# Patient Record
Sex: MALE | Race: WHITE | URBAN - METROPOLITAN AREA
[De-identification: names, ages, dates, MRNs, and addresses within clinical notes are randomized per-mention and may not be internally consistent; named-entity substitution may affect disease eponyms.]

---

## 2019-05-21 ENCOUNTER — HOSPITAL ENCOUNTER (INPATIENT)
Age: 39
LOS: 5 days | Discharge: HOME | DRG: 885 | End: 2019-05-26
Attending: EMERGENCY MEDICINE | Admitting: PSYCHIATRY & NEUROLOGY

## 2019-05-21 DIAGNOSIS — R44.3 HALLUCINATIONS: Primary | ICD-10-CM

## 2019-05-21 PROBLEM — F20.9 SCHIZOPHRENIA (HCC): Status: ACTIVE | Noted: 2019-05-21

## 2019-05-21 LAB
A/G RATIO: 1.6 (ref 1.1–2.2)
ACETAMINOPHEN LEVEL: <5 UG/ML (ref 10–30)
ALBUMIN SERPL-MCNC: 4.4 G/DL (ref 3.4–5)
ALP BLD-CCNC: 61 U/L (ref 40–129)
ALT SERPL-CCNC: 10 U/L (ref 10–40)
AMPHETAMINE SCREEN, URINE: ABNORMAL
ANION GAP SERPL CALCULATED.3IONS-SCNC: 10 MMOL/L (ref 3–16)
AST SERPL-CCNC: 18 U/L (ref 15–37)
BARBITURATE SCREEN URINE: ABNORMAL
BASOPHILS ABSOLUTE: 0.1 K/UL (ref 0–0.2)
BASOPHILS RELATIVE PERCENT: 0.5 %
BENZODIAZEPINE SCREEN, URINE: ABNORMAL
BILIRUB SERPL-MCNC: 0.6 MG/DL (ref 0–1)
BUN BLDV-MCNC: 6 MG/DL (ref 7–20)
CALCIUM SERPL-MCNC: 9 MG/DL (ref 8.3–10.6)
CANNABINOID SCREEN URINE: POSITIVE
CHLORIDE BLD-SCNC: 105 MMOL/L (ref 99–110)
CO2: 24 MMOL/L (ref 21–32)
COCAINE METABOLITE SCREEN URINE: ABNORMAL
CREAT SERPL-MCNC: 0.6 MG/DL (ref 0.9–1.3)
EKG ATRIAL RATE: 50 BPM
EKG DIAGNOSIS: NORMAL
EKG P AXIS: 54 DEGREES
EKG P-R INTERVAL: 120 MS
EKG Q-T INTERVAL: 404 MS
EKG QRS DURATION: 102 MS
EKG QTC CALCULATION (BAZETT): 368 MS
EKG R AXIS: 87 DEGREES
EKG T AXIS: 78 DEGREES
EKG VENTRICULAR RATE: 50 BPM
EOSINOPHILS ABSOLUTE: 0.2 K/UL (ref 0–0.6)
EOSINOPHILS RELATIVE PERCENT: 1.6 %
ETHANOL: NORMAL MG/DL (ref 0–0.08)
GFR AFRICAN AMERICAN: >60
GFR NON-AFRICAN AMERICAN: >60
GLOBULIN: 2.8 G/DL
GLUCOSE BLD-MCNC: 116 MG/DL (ref 70–99)
HCT VFR BLD CALC: 40.2 % (ref 40.5–52.5)
HEMOGLOBIN: 14.2 G/DL (ref 13.5–17.5)
LYMPHOCYTES ABSOLUTE: 2.1 K/UL (ref 1–5.1)
LYMPHOCYTES RELATIVE PERCENT: 19.7 %
Lab: ABNORMAL
MCH RBC QN AUTO: 33.3 PG (ref 26–34)
MCHC RBC AUTO-ENTMCNC: 35.4 G/DL (ref 31–36)
MCV RBC AUTO: 94 FL (ref 80–100)
METHADONE SCREEN, URINE: ABNORMAL
MONOCYTES ABSOLUTE: 1 K/UL (ref 0–1.3)
MONOCYTES RELATIVE PERCENT: 9.2 %
NEUTROPHILS ABSOLUTE: 7.2 K/UL (ref 1.7–7.7)
NEUTROPHILS RELATIVE PERCENT: 69 %
OPIATE SCREEN URINE: ABNORMAL
OXYCODONE URINE: ABNORMAL
PDW BLD-RTO: 13.7 % (ref 12.4–15.4)
PH UA: 6
PHENCYCLIDINE SCREEN URINE: ABNORMAL
PLATELET # BLD: 320 K/UL (ref 135–450)
PMV BLD AUTO: 7.8 FL (ref 5–10.5)
POTASSIUM SERPL-SCNC: 3.7 MMOL/L (ref 3.5–5.1)
PROPOXYPHENE SCREEN: ABNORMAL
RBC # BLD: 4.27 M/UL (ref 4.2–5.9)
SALICYLATE, SERUM: 0.5 MG/DL (ref 15–30)
SODIUM BLD-SCNC: 139 MMOL/L (ref 136–145)
TOTAL PROTEIN: 7.2 G/DL (ref 6.4–8.2)
WBC # BLD: 10.4 K/UL (ref 4–11)

## 2019-05-21 PROCEDURE — 80307 DRUG TEST PRSMV CHEM ANLYZR: CPT

## 2019-05-21 PROCEDURE — G0480 DRUG TEST DEF 1-7 CLASSES: HCPCS

## 2019-05-21 PROCEDURE — 85025 COMPLETE CBC W/AUTO DIFF WBC: CPT

## 2019-05-21 PROCEDURE — 99223 1ST HOSP IP/OBS HIGH 75: CPT | Performed by: PSYCHIATRY & NEUROLOGY

## 2019-05-21 PROCEDURE — 6370000000 HC RX 637 (ALT 250 FOR IP): Performed by: PSYCHIATRY & NEUROLOGY

## 2019-05-21 PROCEDURE — 93010 ELECTROCARDIOGRAM REPORT: CPT | Performed by: INTERNAL MEDICINE

## 2019-05-21 PROCEDURE — 1240000000 HC EMOTIONAL WELLNESS R&B

## 2019-05-21 PROCEDURE — 97535 SELF CARE MNGMENT TRAINING: CPT

## 2019-05-21 PROCEDURE — 93005 ELECTROCARDIOGRAM TRACING: CPT | Performed by: PSYCHIATRY & NEUROLOGY

## 2019-05-21 PROCEDURE — 80053 COMPREHEN METABOLIC PANEL: CPT

## 2019-05-21 PROCEDURE — 99285 EMERGENCY DEPT VISIT HI MDM: CPT

## 2019-05-21 PROCEDURE — 99222 1ST HOSP IP/OBS MODERATE 55: CPT | Performed by: PHYSICIAN ASSISTANT

## 2019-05-21 PROCEDURE — 97165 OT EVAL LOW COMPLEX 30 MIN: CPT

## 2019-05-21 RX ORDER — TRAZODONE HYDROCHLORIDE 50 MG/1
50 TABLET ORAL NIGHTLY PRN
Status: DISCONTINUED | OUTPATIENT
Start: 2019-05-21 | End: 2019-05-26 | Stop reason: HOSPADM

## 2019-05-21 RX ORDER — ZIPRASIDONE MESYLATE 20 MG/ML
20 INJECTION, POWDER, LYOPHILIZED, FOR SOLUTION INTRAMUSCULAR
Status: ACTIVE | OUTPATIENT
Start: 2019-05-21 | End: 2019-05-21

## 2019-05-21 RX ORDER — MAGNESIUM HYDROXIDE/ALUMINUM HYDROXICE/SIMETHICONE 120; 1200; 1200 MG/30ML; MG/30ML; MG/30ML
30 SUSPENSION ORAL EVERY 6 HOURS PRN
Status: DISCONTINUED | OUTPATIENT
Start: 2019-05-21 | End: 2019-05-26 | Stop reason: HOSPADM

## 2019-05-21 RX ORDER — NICOTINE 21 MG/24HR
1 PATCH, TRANSDERMAL 24 HOURS TRANSDERMAL DAILY
Status: DISCONTINUED | OUTPATIENT
Start: 2019-05-21 | End: 2019-05-26 | Stop reason: HOSPADM

## 2019-05-21 RX ORDER — HYDROXYZINE PAMOATE 50 MG/1
50 CAPSULE ORAL 3 TIMES DAILY PRN
Status: DISCONTINUED | OUTPATIENT
Start: 2019-05-21 | End: 2019-05-26 | Stop reason: HOSPADM

## 2019-05-21 RX ORDER — BENZTROPINE MESYLATE 1 MG/ML
2 INJECTION INTRAMUSCULAR; INTRAVENOUS 2 TIMES DAILY PRN
Status: DISCONTINUED | OUTPATIENT
Start: 2019-05-21 | End: 2019-05-26 | Stop reason: HOSPADM

## 2019-05-21 RX ORDER — RISPERIDONE 1 MG/1
1 TABLET, ORALLY DISINTEGRATING ORAL 2 TIMES DAILY
Status: DISCONTINUED | OUTPATIENT
Start: 2019-05-21 | End: 2019-05-26 | Stop reason: HOSPADM

## 2019-05-21 RX ORDER — OLANZAPINE 5 MG/1
5 TABLET ORAL
Status: ACTIVE | OUTPATIENT
Start: 2019-05-21 | End: 2019-05-21

## 2019-05-21 RX ORDER — ACETAMINOPHEN 325 MG/1
650 TABLET ORAL EVERY 4 HOURS PRN
Status: DISCONTINUED | OUTPATIENT
Start: 2019-05-21 | End: 2019-05-26 | Stop reason: HOSPADM

## 2019-05-21 RX ADMIN — RISPERIDONE 1 MG: 1 TABLET, ORALLY DISINTEGRATING ORAL at 20:23

## 2019-05-21 RX ADMIN — RISPERIDONE 1 MG: 1 TABLET, ORALLY DISINTEGRATING ORAL at 12:07

## 2019-05-21 RX ADMIN — TRAZODONE HYDROCHLORIDE 50 MG: 50 TABLET ORAL at 20:23

## 2019-05-21 ASSESSMENT — LIFESTYLE VARIABLES
HISTORY_ALCOHOL_USE: NO
HISTORY_ALCOHOL_USE: NO

## 2019-05-21 ASSESSMENT — SLEEP AND FATIGUE QUESTIONNAIRES
DO YOU USE A SLEEP AID: NO
DO YOU HAVE DIFFICULTY SLEEPING: NO
AVERAGE NUMBER OF SLEEP HOURS: 6
AVERAGE NUMBER OF SLEEP HOURS: 6
DO YOU HAVE DIFFICULTY SLEEPING: NO
DO YOU USE A SLEEP AID: NO

## 2019-05-21 ASSESSMENT — ENCOUNTER SYMPTOMS
RESPIRATORY NEGATIVE: 1
EYES NEGATIVE: 1
GASTROINTESTINAL NEGATIVE: 1
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
## 2019-05-21 NOTE — ED NOTES
Level of Care Disposition: [address] [facility]      Patient was seen by ED provider and [facility]FFILIATE OF [facility] staff. The case presented to psychiatric provider on-call Dr. Jefm Mohs. Based on the ED evaluation and information presented to the provider by Destiny Pimentel RN it was determined that inpatient hospitalization is the least restrictive environment for the patient at this time. The patient will be admitted to the inpatient unit. Admitting provider did not order suicide precautions based on patient denying suicidal ideation.         RATIONALE FOR ADMISSION:   Patient has a mental illness: and is currently off his medications, and reporting active command hallucinations,       Insurance Pre certification Authorization: DAYRON Huddleston RN  05/21/19 0345
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
## 2019-05-21 NOTE — ED NOTES
Pt states he was inpatient at a [facility] in [place] and was released a couple weeks ago and walked here.       Araceli Parker RN  05/21/19 1954
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
## 2019-05-21 NOTE — PROGRESS NOTES
Inpatient Occupational Therapy  Evaluation and Treatment    Unit:  [facility]  Date:  5/21/2019  Patient Name:    Zarina Mendoza  Admitting diagnosis:  Schizophrenia [facility]) [F20.9]  Admit Date:  5/21/2019  Precautions/Restrictions/WB Status/ Lines/ Wounds/ Oxygen:  Up as tolerated  Treatment Time:  10:35-11:11  Treatment Number:  1    Patient Goals for Therapy:  \" I just want to get on my medicine. \"      Discharge Recommendations:  Home with daily assist/supervision as needed    DME needs for discharge:   none     AM-PAC Score:  24     Home Health S4 Level: ? NA   ? Level 1- Standard  ? Level 2- Social  ? Level 3- Safety  ? Level 4- Sick    ACLS:  Mode 4.4   Engaging Abilities and Following Safety Precautions When the Person Can Complete a Goal     DESCRIPTION:     34% Cognitive Assistance: The person may live with someone who does a daily check on the environment, removing any safety hazards and solving problems when minor changes in the home occur. May be alone for part of the day with a procedure for obtaining help by phone or from a neighbor. May have a daily allowance and go to familiar places in the neighborhood. 34% minimum cognitive assistance is required to set up new activities and clean up after routine activities. 8% Physical Assistance is needed to assist with fine motor activities. Preadmission Environment:    Pt. Lives alone/homeless. Preadmission Status / PLOF:  History of falls   No  Pt. Able to drive   No   Pt Fully independent for ADLs/IADLs. Yes      Pt. Fully independent for transfers and gait and walked with: No Device    Sleep Hygiene:  Pt. Reports no specific bed/wake time. Income:  SSDI  Financial Management:   Self;  Pt. Reports running out of money by the end of the month. Leisure Interests:  Walks, watch sports, draw, video games  Medication Management:  \"I'm not very good at that. \"    Health Management:  Pt. Reports difficulty keeping doctor appointments.   Social Network:  [employer] always around drug addicts so nothing goes right. \"  Pt. Reports conflicts with family. Stressors:  1. Temper. 2.  My perception. 3.  Not meeting goals. Coping Skills:  \"I talk to myself. \"  \"I put my attention on a healthier pattern of thinking. \"       Pain  No  Rating:  Location:  Pain Medicine Status:  Denies need      Cognition    A&Ox4, patient appropriate and cooperative. Follows multiple step commands. Upper Extremity ROM:    WFL, pt able to perform all bed mobility, transfers, and gait without ROM limitation. Upper Extremity Strength:    WFL, pt able to perform all bed mobility, transfers, and gait without strength limitation. Upper Extremity Sensation:    No apparent deficits. Upper Extremity Proprioception:    No apparent deficits. Skin Integrity:  WFL     Coordination and Tone:  Impaired fine motor (decreased speed/accuracy)    Balance  Static Sitting:              Good   Dynamic Sitting:   Good   Static Standing:  Good   Dynamic Standing:  Good     Bed mobility:  Independent  Supine to sit:  Sit to supine:  Scooting to head of bed:  Scooting in sitting:  Rolling:  Bridging:    Transfers:  Independent  Sit to stand:  Stand to sit:  Bed/Chair to/from toilet:    Self Care: Independent  Toileting:  Grooming:  Dressing:    Exercise / Activities Initiated:   Pt. Educated on role of OT. Pt. Participated in:  ACLS, ADL retraining, PLB. Assessment of Deficits:   Pt demonstrated decreased activity tolerance, decreased safety awareness, decreased cognition, and decreased ADL/IADL status. Pt. Limited during evaluation by decreased cognition. At end of evaluation, pt. In room. Goal(s) : To be met in 3 Visits:  1). Pt. To complete interest checklist.       To be met in 5 Visits:  1).  Pt. To verbalize 3 coping skills. 2). Pt. To complete ADM. 3). Pt. To complete monthly budget with min assist.  4).  Pt.  To demo ability to read prescription bottle and fill out one wks worth of

## 2019-05-21 NOTE — H&P
acute distress, [x] appears mildly distressed,      []  Other:      MUSCULOSKELETAL:   Gait:   [x] normal, [] antalgic, [] unsteady, [] in bed, gait not evaluated   Station:  [] erect, [] sitting, [] recumbent, [] other        Strength/tone:  [x] muscle strength and tone appear consistent with age and condition     [] atrophy      [] abnormal movements  PSYCHIATRIC:    Relatedness:  [] cooperative, [] guarded, [] indifferent, [x] hostile,      [] sedated  Speech:  [x] normal prosody, loud and nl rate [] pressured, [] decreased volume,    [] slurred, [] halting, [] slowed, [] other     [] echolalia, [] incoherent, [] stuttering   Eye contact:  [] direct, [] avoidant, [x] intense  Kinetics:  [] normal, [x] increased, [] decreased  Mood:   [] stable, [] depressed, [] anxious, [] irritable,     [x] labile  Affect:   [] normal range, [] constricted, [] depressed, [] anxious,     [x] angry, [] blunted  Hallucinations  [] denies, [x] rtis  [] visual,  [] olfactory, [] tactile  Delusions  [] none, [] grandiose,  [] jealous,  [x] persecutory,  [] somatic,     [] bizarre  Preoccupations  [] none, [x] violence, [] obsessions, [] other     Suicidal ideation  [x] denies, [] endorses  Homicidal ideation [x] denies, [] endorses  Thought process: [] logical, [] circumstantial, [x] tangential, [] CIARAN,     [] simplistic, [x] disorganized  Associations:  [] logical and coherent, [] loosening, [] disorganized  Insight:   [] good, [] fair, [x] poor  Judgment:  [] good, [] fair, [x] poor  Attention and concentration:     [] intact, [x] limited, [] able to focus on interview,     [] grossly impaired  Orientation:  [x] person, place, time, situation     [] disoriented to:     Memory:  Remote memory [] intact, [x] guy     Recent memory  [] intact, [x] guy          IMPRESSION    Axis I: Scz paranoid type, cannabis abuse  Axis II: aspd  Axis III: see medical hx  Axis IV: poor coping skills, cmi, poor compliance, no support    PLAN   1.   Admit to Adult Behavioral Unit / Senior Behavioral Unit  2. Consult Internal Medicine to evaluate and treat medical conditions  3. Adjust psychotropic medications to target symptoms start risperdal and consider invega sustenna  4. Occupational Therapy, Physical Therapy, Group Psychotherapy as tolerated   5. Reviewed treatment plan with patient including medication risks, benefits, side effects. Obtained informed consent for treatment.      Spent at least 70 minutes with evaluation process and more than 50% of the time was spent obtaining history and planning treatment with the patient

## 2019-05-21 NOTE — BH NOTE
States he used to like to shoot hoop. States that the \"drug addicts\" takes everything from him. Pt states that he came to [place] from [facility] over the past couple of months. States I plan \"to travel to all 52 states\". Pt is a poor historian. States he has been homeless for 8 years.      Iam Dhillon MA, CTRS
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
## 2019-05-22 PROCEDURE — 99233 SBSQ HOSP IP/OBS HIGH 50: CPT | Performed by: PSYCHIATRY & NEUROLOGY

## 2019-05-22 PROCEDURE — 1240000000 HC EMOTIONAL WELLNESS R&B

## 2019-05-22 PROCEDURE — 6370000000 HC RX 637 (ALT 250 FOR IP): Performed by: PSYCHIATRY & NEUROLOGY

## 2019-05-22 RX ADMIN — RISPERIDONE 1 MG: 1 TABLET, ORALLY DISINTEGRATING ORAL at 21:03

## 2019-05-22 RX ADMIN — RISPERIDONE 1 MG: 1 TABLET, ORALLY DISINTEGRATING ORAL at 08:56

## 2019-05-22 ASSESSMENT — ENCOUNTER SYMPTOMS
SORE THROAT: 0
DIARRHEA: 0
BACK PAIN: 0
CHEST TIGHTNESS: 0
SHORTNESS OF BREATH: 0
NAUSEA: 0
CONSTIPATION: 0
ABDOMINAL PAIN: 0

## 2019-05-22 NOTE — GROUP NOTE
Group Therapy Note    Date: May 22    Group Start Time: 0115  Group End Time: 0200  Group Topic: Cognitive Skills    [facility]        Group Therapy Note    Attendees: 8           Discussed communication skills using DEAR MAN and discussed self soothing techniques in times of high stress    Notes: Pt came in long-term through group, but was fully engaged in topic and gave insight and feedback to group discussion. Status After Intervention:  Improved    Participation Level: Active Listener and Interactive    Participation Quality: Appropriate and Attentive      Speech:  normal      Thought Process/Content: Logical      Affective Functioning: Constricted/Restricted      Mood: anxious and depressed      Level of consciousness:  Alert      Response to Learning: Able to verbalize current knowledge/experience, Able to verbalize/acknowledge new learning and Able to retain information      Endings: None Reported    Modes of Intervention: Education, Clarifying and Problem-solving      Discipline Responsible: /Counselor      Signature:   REAL Stover

## 2019-05-22 NOTE — PROGRESS NOTES
Patient gait normal. Mood better, affect constricted Hallucinations Present - multiple voices, suicidal ideations no specific plan to harm self Speech fluent and spontaneous    Data  Labs:   Admission on 05/21/2019   Component Date Value Ref Range Status    WBC 05/21/2019 10.4  4.0 - 11.0 K/uL Final    RBC 05/21/2019 4.27  4.20 - 5.90 M/uL Final    Hemoglobin 05/21/2019 14.2  13.5 - 17.5 g/dL Final    Hematocrit 05/21/2019 40.2* 40.5 - 52.5 % Final    MCV 05/21/2019 94.0  80.0 - 100.0 fL Final    MCH 05/21/2019 33.3  26.0 - 34.0 pg Final    MCHC 05/21/2019 35.4  31.0 - 36.0 g/dL Final    RDW 05/21/2019 13.7  12.4 - 15.4 % Final    Platelets 12/54/2223 320  135 - 450 K/uL Final    MPV 05/21/2019 7.8  5.0 - 10.5 fL Final    Neutrophils % 05/21/2019 69.0  % Final    Lymphocytes % 05/21/2019 19.7  % Final    Monocytes % 05/21/2019 9.2  % Final    Eosinophils % 05/21/2019 1.6  % Final    Basophils % 05/21/2019 0.5  % Final    Neutrophils # 05/21/2019 7.2  1.7 - 7.7 K/uL Final    Lymphocytes # 05/21/2019 2.1  1.0 - 5.1 K/uL Final    Monocytes # 05/21/2019 1.0  0.0 - 1.3 K/uL Final    Eosinophils # 05/21/2019 0.2  0.0 - 0.6 K/uL Final    Basophils # 05/21/2019 0.1  0.0 - 0.2 K/uL Final    Sodium 05/21/2019 139  136 - 145 mmol/L Final    Potassium 05/21/2019 3.7  3.5 - 5.1 mmol/L Final    Chloride 05/21/2019 105  99 - 110 mmol/L Final    CO2 05/21/2019 24  21 - 32 mmol/L Final    Anion Gap 05/21/2019 10  3 - 16 Final    Glucose 05/21/2019 116* 70 - 99 mg/dL Final    BUN 05/21/2019 6* 7 - 20 mg/dL Final    CREATININE 05/21/2019 0.6* 0.9 - 1.3 mg/dL Final    GFR Non- 05/21/2019 >60  >60 Final    Comment: >60 mL/min/1.73m2 EGFR, calc. for ages 25 and older using the  MDRD formula (not corrected for weight), is valid for stable  renal function.  GFR  05/21/2019 >60  >60 Final    Comment: Chronic Kidney Disease: less than 60 ml/min/1.73 sq.m.           Kidney Failure: less than 15 ml/min/1.73 sq.m. Results valid for patients 18 years and older.  Calcium 05/21/2019 9.0  8.3 - 10.6 mg/dL Final    Total Protein 05/21/2019 7.2  6.4 - 8.2 g/dL Final    Alb 05/21/2019 4.4  3.4 - 5.0 g/dL Final    Albumin/Globulin Ratio 05/21/2019 1.6  1.1 - 2.2 Final    Total Bilirubin 05/21/2019 0.6  0.0 - 1.0 mg/dL Final    Alkaline Phosphatase 05/21/2019 61  40 - 129 U/L Final    ALT 05/21/2019 10  10 - 40 U/L Final    AST 05/21/2019 18  15 - 37 U/L Final    Globulin 05/21/2019 2.8  g/dL Final    Ethanol Lvl 05/21/2019 None Detected  mg/dL Final    Comment:    None Detected  Conversion factor:  100 mg/dl = .100 g/dl  For Medical Purposes Only      Acetaminophen Level 05/21/2019 <5* 10 - 30 ug/mL Final    Comment: Therapeutic Range: 10.0-30.0 ug/mL  Toxic: >=335 ug/mL      Salicylate, Serum 22/13/8670 0.5* 15.0 - 30.0 mg/dL Final    Comment: Therapeutic Range: 15.0-30.0 mg/dL  Toxic: >30.0 mg/dL      Amphetamine Screen, Urine 05/21/2019 Neg  Negative <1000ng/mL Final    Barbiturate Screen, Ur 05/21/2019 Neg  Negative <200 ng/mL Final    Benzodiazepine Screen, Urine 05/21/2019 Neg  Negative <200 ng/mL Final    Cannabinoid Scrn, Ur 05/21/2019 POSITIVE* Negative <50 ng/mL Final    Cocaine Metabolite Screen, Urine 05/21/2019 Neg  Negative <300 ng/mL Final    Opiate Scrn, Ur 05/21/2019 Neg  Negative <300 ng/mL Final    Comment: \"Therapeutic levels of pain medication, especially oxycontin and synthetic  opioids, may not be detected by this Methodology. Pain management screen  panel  Drug panel-PM-Hi Res Ur, Interp (PAIN) should be considered for drug  monitoring \".       PCP Screen, Urine 05/21/2019 Neg  Negative <25 ng/mL Final    Methadone Screen, Urine 05/21/2019 Neg  Negative <300 ng/mL Final    Propoxyphene Scrn, Ur 05/21/2019 Neg  Negative <300 ng/mL Final    pH, UA 05/21/2019 6.0   Final    Comment: Urine pH less than 5.0 or greater than 8.0 may indicate sample adulteration. Another sample should be collected if clinically  indicated.  Drug Screen Comment: 05/21/2019 see below   Final    Comment: This method is a screening test to detect only these drug  classes as part of a medical workup. Confirmatory testing  by another method should be ordered if clinically indicated.       Oxycodone Urine 05/21/2019 Neg  Negative <100 ng/ml Final    Ventricular Rate 05/21/2019 50  BPM Final    Atrial Rate 05/21/2019 50  BPM Final    P-R Interval 05/21/2019 120  ms Final    QRS Duration 05/21/2019 102  ms Final    Q-T Interval 05/21/2019 404  ms Final    QTc Calculation (Bazett) 05/21/2019 368  ms Final    P Axis 05/21/2019 54  degrees Final    R Axis 05/21/2019 87  degrees Final    T Axis 05/21/2019 78  degrees Final    Diagnosis 05/21/2019 Sinus bradycardia with sinus arrhythmiaOtherwise normal ECGNo previous ECGs availableConfirmed by Jackie Mckeon MD, [facility] ([facility]) on 5/21/2019 5:05:40 PM   Final            Medications  Current Facility-Administered Medications: [COMPLETED] paliperidone palmitate ER (INVEGA SUSTENNA) IM injection 234 mg, 234 mg, Intramuscular, Once **FOLLOWED BY** [START ON 5/25/2019] paliperidone palmitate ER (INVEGA SUSTENNA) IM injection 156 mg, 156 mg, Intramuscular, Once **FOLLOWED BY** [START ON 6/15/2019] paliperidone palmitate ER (INVEGA SUSTENNA) IM injection 156 mg, 156 mg, Intramuscular, Q30 Days  acetaminophen (TYLENOL) tablet 650 mg, 650 mg, Oral, Q4H PRN  hydrOXYzine (VISTARIL) capsule 50 mg, 50 mg, Oral, TID PRN  traZODone (DESYREL) tablet 50 mg, 50 mg, Oral, Nightly PRN  benztropine mesylate (COGENTIN) injection 2 mg, 2 mg, Intramuscular, BID PRN  magnesium hydroxide (MILK OF MAGNESIA) 400 MG/5ML suspension 30 mL, 30 mL, Oral, Daily PRN  aluminum & magnesium hydroxide-simethicone (MAALOX) 200-200-20 MG/5ML suspension 30 mL, 30 mL, Oral, Q6H PRN  nicotine (NICODERM CQ) 21 MG/24HR 1 patch, 1 patch, Transdermal, Daily  risperiDONE (RISPERDAL M-TABS) disintegrating tablet 1 mg, 1 mg, Oral, BID    ASSESSMENT AND PLAN    Active Problems:    Schizophrenia ([address]    Hallucinations    Bradycardia    Wheezing    Tobacco abuse  Resolved Problems:    * No resolved hospital problems. *       1. Patient s symptoms   are improving  2. Probable discharge is next week  3. Discharge planning is incomplete  4 Suicidal ideation is better  5 total time 40 minutes    I spent 35 minutes face to face and more than 50 % was spent coordinating care

## 2019-05-23 PROCEDURE — 1240000000 HC EMOTIONAL WELLNESS R&B

## 2019-05-23 PROCEDURE — 6370000000 HC RX 637 (ALT 250 FOR IP): Performed by: PSYCHIATRY & NEUROLOGY

## 2019-05-23 PROCEDURE — 99233 SBSQ HOSP IP/OBS HIGH 50: CPT | Performed by: PSYCHIATRY & NEUROLOGY

## 2019-05-23 RX ADMIN — RISPERIDONE 1 MG: 1 TABLET, ORALLY DISINTEGRATING ORAL at 08:12

## 2019-05-23 RX ADMIN — RISPERIDONE 1 MG: 1 TABLET, ORALLY DISINTEGRATING ORAL at 20:16

## 2019-05-23 ASSESSMENT — ENCOUNTER SYMPTOMS
ABDOMINAL PAIN: 0
BACK PAIN: 0
NAUSEA: 0
DIARRHEA: 0
SHORTNESS OF BREATH: 0
CONSTIPATION: 0
CHEST TIGHTNESS: 0
SORE THROAT: 0

## 2019-05-23 NOTE — GROUP NOTE
Group Therapy Note    Date: May 22    Group Start Time: 2000  Group End Time: 2035  Group Topic: [facility]        Group Therapy Note    Attendees: discussed goals and importance of setting goals. Night time milieu activities discussed. Patient's Goal:  To do a self analysis of strengths and weaknesses    Notes:   It was very beneficial    Status After Intervention:  Improved    Participation Level: Interactive    Participation Quality: Appropriate      Speech:  normal      Thought Process/Content: Linear      Affective Functioning: Congruent      Mood: anxious      Level of consciousness:  Alert and Oriented x4      Response to Learning: Progressing to goal      Endings: None Reported    Modes of Intervention: Support      Discipline Responsible: HeyKiki      Signature:  Zach Berg

## 2019-05-23 NOTE — PROGRESS NOTES
gait normal. Mood better, affect constricted Hallucinations Present - multiple voices, suicidal ideations no specific plan to harm self Speech fluent and spontaneous    Data  Labs:   Admission on 05/21/2019   Component Date Value Ref Range Status    WBC 05/21/2019 10.4  4.0 - 11.0 K/uL Final    RBC 05/21/2019 4.27  4.20 - 5.90 M/uL Final    Hemoglobin 05/21/2019 14.2  13.5 - 17.5 g/dL Final    Hematocrit 05/21/2019 40.2* 40.5 - 52.5 % Final    MCV 05/21/2019 94.0  80.0 - 100.0 fL Final    MCH 05/21/2019 33.3  26.0 - 34.0 pg Final    MCHC 05/21/2019 35.4  31.0 - 36.0 g/dL Final    RDW 05/21/2019 13.7  12.4 - 15.4 % Final    Platelets 26/74/1970 320  135 - 450 K/uL Final    MPV 05/21/2019 7.8  5.0 - 10.5 fL Final    Neutrophils % 05/21/2019 69.0  % Final    Lymphocytes % 05/21/2019 19.7  % Final    Monocytes % 05/21/2019 9.2  % Final    Eosinophils % 05/21/2019 1.6  % Final    Basophils % 05/21/2019 0.5  % Final    Neutrophils # 05/21/2019 7.2  1.7 - 7.7 K/uL Final    Lymphocytes # 05/21/2019 2.1  1.0 - 5.1 K/uL Final    Monocytes # 05/21/2019 1.0  0.0 - 1.3 K/uL Final    Eosinophils # 05/21/2019 0.2  0.0 - 0.6 K/uL Final    Basophils # 05/21/2019 0.1  0.0 - 0.2 K/uL Final    Sodium 05/21/2019 139  136 - 145 mmol/L Final    Potassium 05/21/2019 3.7  3.5 - 5.1 mmol/L Final    Chloride 05/21/2019 105  99 - 110 mmol/L Final    CO2 05/21/2019 24  21 - 32 mmol/L Final    Anion Gap 05/21/2019 10  3 - 16 Final    Glucose 05/21/2019 116* 70 - 99 mg/dL Final    BUN 05/21/2019 6* 7 - 20 mg/dL Final    CREATININE 05/21/2019 0.6* 0.9 - 1.3 mg/dL Final    GFR Non- 05/21/2019 >60  >60 Final    Comment: >60 mL/min/1.73m2 EGFR, calc. for ages 25 and older using the  MDRD formula (not corrected for weight), is valid for stable  renal function.  GFR  05/21/2019 >60  >60 Final    Comment: Chronic Kidney Disease: less than 60 ml/min/1.73 sq.m.           Kidney Failure: less than 15 ml/min/1.73 sq.m. Results valid for patients 18 years and older.  Calcium 05/21/2019 9.0  8.3 - 10.6 mg/dL Final    Total Protein 05/21/2019 7.2  6.4 - 8.2 g/dL Final    Alb 05/21/2019 4.4  3.4 - 5.0 g/dL Final    Albumin/Globulin Ratio 05/21/2019 1.6  1.1 - 2.2 Final    Total Bilirubin 05/21/2019 0.6  0.0 - 1.0 mg/dL Final    Alkaline Phosphatase 05/21/2019 61  40 - 129 U/L Final    ALT 05/21/2019 10  10 - 40 U/L Final    AST 05/21/2019 18  15 - 37 U/L Final    Globulin 05/21/2019 2.8  g/dL Final    Ethanol Lvl 05/21/2019 None Detected  mg/dL Final    Comment:    None Detected  Conversion factor:  100 mg/dl = .100 g/dl  For Medical Purposes Only      Acetaminophen Level 05/21/2019 <5* 10 - 30 ug/mL Final    Comment: Therapeutic Range: 10.0-30.0 ug/mL  Toxic: >=532 ug/mL      Salicylate, Serum 67/85/5510 0.5* 15.0 - 30.0 mg/dL Final    Comment: Therapeutic Range: 15.0-30.0 mg/dL  Toxic: >30.0 mg/dL      Amphetamine Screen, Urine 05/21/2019 Neg  Negative <1000ng/mL Final    Barbiturate Screen, Ur 05/21/2019 Neg  Negative <200 ng/mL Final    Benzodiazepine Screen, Urine 05/21/2019 Neg  Negative <200 ng/mL Final    Cannabinoid Scrn, Ur 05/21/2019 POSITIVE* Negative <50 ng/mL Final    Cocaine Metabolite Screen, Urine 05/21/2019 Neg  Negative <300 ng/mL Final    Opiate Scrn, Ur 05/21/2019 Neg  Negative <300 ng/mL Final    Comment: \"Therapeutic levels of pain medication, especially oxycontin and synthetic  opioids, may not be detected by this Methodology. Pain management screen  panel  Drug panel-PM-Hi Res Ur, Interp (PAIN) should be considered for drug  monitoring \".       PCP Screen, Urine 05/21/2019 Neg  Negative <25 ng/mL Final    Methadone Screen, Urine 05/21/2019 Neg  Negative <300 ng/mL Final    Propoxyphene Scrn, Ur 05/21/2019 Neg  Negative <300 ng/mL Final    pH, UA 05/21/2019 6.0   Final    Comment: Urine pH less than 5.0 or greater than 8.0 may indicate sample adulteration. Another sample should be collected if clinically  indicated.  Drug Screen Comment: 05/21/2019 see below   Final    Comment: This method is a screening test to detect only these drug  classes as part of a medical workup. Confirmatory testing  by another method should be ordered if clinically indicated.       Oxycodone Urine 05/21/2019 Neg  Negative <100 ng/ml Final    Ventricular Rate 05/21/2019 50  BPM Final    Atrial Rate 05/21/2019 50  BPM Final    P-R Interval 05/21/2019 120  ms Final    QRS Duration 05/21/2019 102  ms Final    Q-T Interval 05/21/2019 404  ms Final    QTc Calculation (Bazett) 05/21/2019 368  ms Final    P Axis 05/21/2019 54  degrees Final    R Axis 05/21/2019 87  degrees Final    T Axis 05/21/2019 78  degrees Final    Diagnosis 05/21/2019 Sinus bradycardia with sinus arrhythmiaOtherwise normal ECGNo previous ECGs availableConfirmed by Selene Pool MD, [facility] ([facility]) on 5/21/2019 5:05:40 PM   Final            Medications  Current Facility-Administered Medications: [COMPLETED] paliperidone palmitate ER (INVEGA SUSTENNA) IM injection 234 mg, 234 mg, Intramuscular, Once **FOLLOWED BY** [START ON 5/25/2019] paliperidone palmitate ER (INVEGA SUSTENNA) IM injection 156 mg, 156 mg, Intramuscular, Once **FOLLOWED BY** [START ON 6/15/2019] paliperidone palmitate ER (INVEGA SUSTENNA) IM injection 156 mg, 156 mg, Intramuscular, Q30 Days  acetaminophen (TYLENOL) tablet 650 mg, 650 mg, Oral, Q4H PRN  hydrOXYzine (VISTARIL) capsule 50 mg, 50 mg, Oral, TID PRN  traZODone (DESYREL) tablet 50 mg, 50 mg, Oral, Nightly PRN  benztropine mesylate (COGENTIN) injection 2 mg, 2 mg, Intramuscular, BID PRN  magnesium hydroxide (MILK OF MAGNESIA) 400 MG/5ML suspension 30 mL, 30 mL, Oral, Daily PRN  aluminum & magnesium hydroxide-simethicone (MAALOX) 200-200-20 MG/5ML suspension 30 mL, 30 mL, Oral, Q6H PRN  nicotine (NICODERM CQ) 21 MG/24HR 1 patch, 1 patch, Transdermal, Daily  risperiDONE (RISPERDAL

## 2019-05-24 PROCEDURE — 1240000000 HC EMOTIONAL WELLNESS R&B

## 2019-05-24 PROCEDURE — 99233 SBSQ HOSP IP/OBS HIGH 50: CPT | Performed by: PSYCHIATRY & NEUROLOGY

## 2019-05-24 PROCEDURE — 6370000000 HC RX 637 (ALT 250 FOR IP): Performed by: PSYCHIATRY & NEUROLOGY

## 2019-05-24 RX ADMIN — RISPERIDONE 1 MG: 1 TABLET, ORALLY DISINTEGRATING ORAL at 09:00

## 2019-05-24 RX ADMIN — RISPERIDONE 1 MG: 1 TABLET, ORALLY DISINTEGRATING ORAL at 21:45

## 2019-05-24 ASSESSMENT — ENCOUNTER SYMPTOMS
BACK PAIN: 0
NAUSEA: 0
CONSTIPATION: 0
CHEST TIGHTNESS: 0
DIARRHEA: 0
SORE THROAT: 0
SHORTNESS OF BREATH: 0
ABDOMINAL PAIN: 0

## 2019-05-24 NOTE — GROUP NOTE
Group Therapy Note    Date: May 24    Group Start Time: 1600  Group End Time: 1700  Group Topic: Healthy Living/Wellness    [address], RN        Group Therapy Note    Attendees: YG volunteer. Status After Intervention:  Unchanged    Participation Level:  Active Listener    Participation Quality: Appropriate      Speech:  normal      Thought Process/Content: Logical      Affective Functioning: Congruent      Mood: euthymic      Level of consciousness:  Alert      Response to Learning: Able to verbalize current knowledge/experience      Endings: None Reported    Modes of Intervention: Education and Support      Discipline Responsible: Registered Nurse/YG Volunteer      Signature:  Eran Carrasquillo RN

## 2019-05-24 NOTE — BH NOTE
[facility]  Day 3 Interdisciplinary Treatment Plan NOTE    Review Date & Time: 5/24/16 0921    Patient was not in treatment team    Admission Type:   Admission Type: Voluntary    Reason for admission:  Reason for Admission: Off meds, wants to be back on meds. Walked from [facility] and is from [facility]. Estimated Length of Stay Update: 2 to 3 days  Estimated Discharge Date Update: 5/26/19 to 5/27/19    PATIENT STRENGTHS:  Patient Strengths Strengths: Communication  Patient Strengths and Limitations:Limitations: Tendency to isolate self, External locus of control, Difficulty problem solving/relies on others to help solve problems  Addictive Behavior:Addictive Behavior  In the past 3 months, have you felt or has someone told you that you have a problem with:  : None  Do you have a history of Chemical Use?: Yes  Do you have a history of Alcohol Use?: No  Do you have a history of Street Drug Abuse?: Yes  Histroy of Prescripton Drug Abuse?: No  Medical Problems:  Past Medical History:   Diagnosis Date    Schizophrenia ([facility] [address]        Risk:  Fall RiskTotal: 53  Augustin Scale Augustin Scale Score: 22  BVC Total: 0  Change in scoresnone.  Changes to plan of Care none    Status EXAM:   Status and Exam  Normal: No  Facial Expression: Flat  Affect: Congruent  Level of Consciousness: Alert  Mood:Normal: No  Mood: Anxious  Motor Activity:Normal: Yes  Motor Activity: Increased(appears anxious)  Interview Behavior: Cooperative  Preception: [place] to Person, Eulice Abhijit to Time, [place] to Place, [place] to Situation  Attention:Normal: No  Attention: Hyperalert  Thought Processes: Circumstantial  Thought Content:Normal: No  Thought Content: Paranoia  Hallucinations: None  Delusions: Yes  Delusions: Persecution  Memory:Normal: Yes  Insight and Judgment: No  Insight and Judgment: Poor Judgment, Poor Insight  Present Suicidal Ideation: No  Present Homicidal Ideation: No    Daily Assessment Last Entry:   Daily Sleep (WDL): Within Defined Limits         Patient Currently in Pain: No  Daily Nutrition (WDL): Exceptions to WDL  Appetite Change: Decreased(sleeping through some meals)  Barriers to Nutrition: None  Level of Assistance: Independent/Self    Patient Monitoring:  Frequency of Checks: 4 times per hour, close    Psychiatric Symptoms:   Depression Symptoms  Depression Symptoms: Isolative, Impaired concentration  Anxiety Symptoms  Anxiety Symptoms: Other (Comment)(OCHOA sleeping in room)  Shiloh Symptoms  [place] Symptoms: Poor judgment     Psychosis Symptoms  Delusion Type: Other (Comment)(OCHOA sleeping in room)    Suicide Risk CSSR-S:  1) Within the past month, have you wished you were dead or wished you could go to sleep and not wake up? : No  2) Have you actually had any thoughts of killing yourself? : No  6) Have you ever done anything, started to do anything, or prepared to do anything to end your life?: No  Change in Resultnone Change in Plan of carenone      EDUCATION:   Learner Progress Toward Treatment Goals: Reviewed goals and plan of care    Method: Individual    Outcome: Verbalized understanding    PATIENT GOALS: To plan for discharge    PLAN/TREATMENT RECOMMENDATIONS UPDATE:Continue treatment and medication management.     GOALS UPDATE:   Time frame for Short-Term Goals: 2 days      Margarette Kilpatrick RN

## 2019-05-24 NOTE — BH NOTE
Writer invited and encouraged pt to attend group at 2:30. Pt declined, pt did not come to group.      Mike Flores MSW,LSW

## 2019-05-24 NOTE — BH NOTE
Writer invited and encouraged pt to attend group at 10 am. Pt declined, pt did not attend group.      Tess Jimenez MSW,LSW

## 2019-05-24 NOTE — PROGRESS NOTES
Department of Psychiatry  Attending Progress Note  Chief Complaint: follow-up Pt stated that he is feeling a little better but cont to hear voices and cont to be paranoid but less intense. Pt denies side effects. We discussed treatment plan. Pt cont to focus on court on June 1. Possible discharge Monday after loading dose. Patient's chart was reviewed and collaborated with  about the treatment plan. SUBJECTIVE:    Patient is feeling better. Suicidal ideation:  denies suicidal ideation. Patient does not have medication side effects. ROS: Patient has new complaints: no  Review of Systems   Constitutional: Negative for activity change and appetite change. HENT: Negative for congestion and sore throat. Respiratory: Negative for chest tightness and shortness of breath. Cardiovascular: Negative for chest pain. Gastrointestinal: Negative for abdominal pain, constipation, diarrhea and nausea. Musculoskeletal: Negative for back pain and gait problem. Skin: Negative for rash. Neurological: Negative for dizziness, tremors and headaches. Psychiatric/Behavioral: Positive for behavioral problems, decreased concentration and hallucinations. Negative for sleep disturbance. The patient is not nervous/anxious. Sleeping adequately:  Yes   Appetite adequate: Yes  Attending groups: No: isolative in room  Visitors:No    OBJECTIVE    Physical  VITALS:  BP (!) 101/59   Pulse 50   Temp 98.1 °F (36.7 °C) (Oral)   Resp 16   Ht 5' 9\" (1.753 m)   Wt 132 lb 6.4 oz (60.1 kg)   SpO2 95%   BMI 19.55 kg/m²     Mental Status Examination:  Patients appearance was well-appearing, hospital attire, lying in bed, fair grooming and fair hygiene. Thoughts are [place] and Logical. Homicidal ideations none. No abnormal movements, tics or mannerisms. Memory intact Aims 0. Concentration Poor. Alert and oriented X 4. Insight and Judgement poor. Patient was cooperative.  Patient gait normal. Mood better, affect constricted Hallucinations Present - multiple voices, suicidal ideations no specific plan to harm self Speech fluent and spontaneous    Data  Labs:   Admission on 05/21/2019   Component Date Value Ref Range Status    WBC 05/21/2019 10.4  4.0 - 11.0 K/uL Final    RBC 05/21/2019 4.27  4.20 - 5.90 M/uL Final    Hemoglobin 05/21/2019 14.2  13.5 - 17.5 g/dL Final    Hematocrit 05/21/2019 40.2* 40.5 - 52.5 % Final    MCV 05/21/2019 94.0  80.0 - 100.0 fL Final    MCH 05/21/2019 33.3  26.0 - 34.0 pg Final    MCHC 05/21/2019 35.4  31.0 - 36.0 g/dL Final    RDW 05/21/2019 13.7  12.4 - 15.4 % Final    Platelets 29/14/7470 320  135 - 450 K/uL Final    MPV 05/21/2019 7.8  5.0 - 10.5 fL Final    Neutrophils % 05/21/2019 69.0  % Final    Lymphocytes % 05/21/2019 19.7  % Final    Monocytes % 05/21/2019 9.2  % Final    Eosinophils % 05/21/2019 1.6  % Final    Basophils % 05/21/2019 0.5  % Final    Neutrophils # 05/21/2019 7.2  1.7 - 7.7 K/uL Final    Lymphocytes # 05/21/2019 2.1  1.0 - 5.1 K/uL Final    Monocytes # 05/21/2019 1.0  0.0 - 1.3 K/uL Final    Eosinophils # 05/21/2019 0.2  0.0 - 0.6 K/uL Final    Basophils # 05/21/2019 0.1  0.0 - 0.2 K/uL Final    Sodium 05/21/2019 139  136 - 145 mmol/L Final    Potassium 05/21/2019 3.7  3.5 - 5.1 mmol/L Final    Chloride 05/21/2019 105  99 - 110 mmol/L Final    CO2 05/21/2019 24  21 - 32 mmol/L Final    Anion Gap 05/21/2019 10  3 - 16 Final    Glucose 05/21/2019 116* 70 - 99 mg/dL Final    BUN 05/21/2019 6* 7 - 20 mg/dL Final    CREATININE 05/21/2019 0.6* 0.9 - 1.3 mg/dL Final    GFR Non- 05/21/2019 >60  >60 Final    Comment: >60 mL/min/1.73m2 EGFR, calc. for ages 25 and older using the  MDRD formula (not corrected for weight), is valid for stable  renal function.  GFR  05/21/2019 >60  >60 Final    Comment: Chronic Kidney Disease: less than 60 ml/min/1.73 sq.m.           Kidney Failure: less than 15 ml/min/1.73 sq.m.  Results valid for patients 18 years and older.  Calcium 05/21/2019 9.0  8.3 - 10.6 mg/dL Final    Total Protein 05/21/2019 7.2  6.4 - 8.2 g/dL Final    Alb 05/21/2019 4.4  3.4 - 5.0 g/dL Final    Albumin/Globulin Ratio 05/21/2019 1.6  1.1 - 2.2 Final    Total Bilirubin 05/21/2019 0.6  0.0 - 1.0 mg/dL Final    Alkaline Phosphatase 05/21/2019 61  40 - 129 U/L Final    ALT 05/21/2019 10  10 - 40 U/L Final    AST 05/21/2019 18  15 - 37 U/L Final    Globulin 05/21/2019 2.8  g/dL Final    Ethanol Lvl 05/21/2019 None Detected  mg/dL Final    Comment:    None Detected  Conversion factor:  100 mg/dl = .100 g/dl  For Medical Purposes Only      Acetaminophen Level 05/21/2019 <5* 10 - 30 ug/mL Final    Comment: Therapeutic Range: 10.0-30.0 ug/mL  Toxic: >=685 ug/mL      Salicylate, Serum 06/11/3750 0.5* 15.0 - 30.0 mg/dL Final    Comment: Therapeutic Range: 15.0-30.0 mg/dL  Toxic: >30.0 mg/dL      Amphetamine Screen, Urine 05/21/2019 Neg  Negative <1000ng/mL Final    Barbiturate Screen, Ur 05/21/2019 Neg  Negative <200 ng/mL Final    Benzodiazepine Screen, Urine 05/21/2019 Neg  Negative <200 ng/mL Final    Cannabinoid Scrn, Ur 05/21/2019 POSITIVE* Negative <50 ng/mL Final    Cocaine Metabolite Screen, Urine 05/21/2019 Neg  Negative <300 ng/mL Final    Opiate Scrn, Ur 05/21/2019 Neg  Negative <300 ng/mL Final    Comment: \"Therapeutic levels of pain medication, especially oxycontin and synthetic  opioids, may not be detected by this Methodology. Pain management screen  panel  Drug panel-PM-Hi Res Ur, Interp (PAIN) should be considered for drug  monitoring \".  PCP Screen, Urine 05/21/2019 Neg  Negative <25 ng/mL Final    Methadone Screen, Urine 05/21/2019 Neg  Negative <300 ng/mL Final    Propoxyphene Scrn, Ur 05/21/2019 Neg  Negative <300 ng/mL Final    pH, UA 05/21/2019 6.0   Final    Comment: Urine pH less than 5.0 or greater than 8.0 may indicate sample adulteration.   Another sample should be collected if clinically  indicated.  Drug Screen Comment: 05/21/2019 see below   Final    Comment: This method is a screening test to detect only these drug  classes as part of a medical workup. Confirmatory testing  by another method should be ordered if clinically indicated.       Oxycodone Urine 05/21/2019 Neg  Negative <100 ng/ml Final    Ventricular Rate 05/21/2019 50  BPM Final    Atrial Rate 05/21/2019 50  BPM Final    P-R Interval 05/21/2019 120  ms Final    QRS Duration 05/21/2019 102  ms Final    Q-T Interval 05/21/2019 404  ms Final    QTc Calculation (Bazett) 05/21/2019 368  ms Final    P Axis 05/21/2019 54  degrees Final    R Axis 05/21/2019 87  degrees Final    T Axis 05/21/2019 78  degrees Final    Diagnosis 05/21/2019 Sinus bradycardia with sinus arrhythmiaOtherwise normal ECGNo previous ECGs availableConfirmed by Jackie Mckeon MD, MENDOZA ([id number]) on 5/21/2019 5:05:40 PM   Final            Medications  Current Facility-Administered Medications: [COMPLETED] paliperidone palmitate ER (INVEGA SUSTENNA) IM injection 234 mg, 234 mg, Intramuscular, Once **FOLLOWED BY** [START ON 5/25/2019] paliperidone palmitate ER (INVEGA SUSTENNA) IM injection 156 mg, 156 mg, Intramuscular, Once **FOLLOWED BY** [START ON 6/15/2019] paliperidone palmitate ER (INVEGA SUSTENNA) IM injection 156 mg, 156 mg, Intramuscular, Q30 Days  acetaminophen (TYLENOL) tablet 650 mg, 650 mg, Oral, Q4H PRN  hydrOXYzine (VISTARIL) capsule 50 mg, 50 mg, Oral, TID PRN  traZODone (DESYREL) tablet 50 mg, 50 mg, Oral, Nightly PRN  benztropine mesylate (COGENTIN) injection 2 mg, 2 mg, Intramuscular, BID PRN  magnesium hydroxide (MILK OF MAGNESIA) 400 MG/5ML suspension 30 mL, 30 mL, Oral, Daily PRN  aluminum & magnesium hydroxide-simethicone (MAALOX) 200-200-20 MG/5ML suspension 30 mL, 30 mL, Oral, Q6H PRN  nicotine (NICODERM CQ) 21 MG/24HR 1 patch, 1 patch, Transdermal, Daily  risperiDONE (RISPERDAL M-TABS) disintegrating tablet 1 mg, 1 mg, Oral, BID    ASSESSMENT AND PLAN    Active Problems:    Schizophrenia ([facility] [address]    Hallucinations    Bradycardia    Wheezing    Tobacco abuse  Resolved Problems:    * No resolved hospital problems. *       1. Patient s symptoms   are improving  2. Probable discharge is next week  3. Discharge planning is incomplete  4 Suicidal ideation is better  5 total time 40 minutes    I spent 35 minutes face to face and more than 50 % was spent coordinating care

## 2019-05-25 PROCEDURE — 6370000000 HC RX 637 (ALT 250 FOR IP): Performed by: PSYCHIATRY & NEUROLOGY

## 2019-05-25 PROCEDURE — 1240000000 HC EMOTIONAL WELLNESS R&B

## 2019-05-25 PROCEDURE — 99233 SBSQ HOSP IP/OBS HIGH 50: CPT | Performed by: PSYCHIATRY & NEUROLOGY

## 2019-05-25 RX ADMIN — RISPERIDONE 1 MG: 1 TABLET, ORALLY DISINTEGRATING ORAL at 22:03

## 2019-05-25 RX ADMIN — RISPERIDONE 1 MG: 1 TABLET, ORALLY DISINTEGRATING ORAL at 08:44

## 2019-05-26 VITALS
BODY MASS INDEX: 19.61 KG/M2 | RESPIRATION RATE: 16 BRPM | OXYGEN SATURATION: 95 % | DIASTOLIC BLOOD PRESSURE: 60 MMHG | TEMPERATURE: 98 F | SYSTOLIC BLOOD PRESSURE: 124 MMHG | HEART RATE: 53 BPM | WEIGHT: 132.4 LBS | HEIGHT: 69 IN

## 2019-05-26 PROCEDURE — 6370000000 HC RX 637 (ALT 250 FOR IP): Performed by: PSYCHIATRY & NEUROLOGY

## 2019-05-26 PROCEDURE — 99239 HOSP IP/OBS DSCHRG MGMT >30: CPT | Performed by: PSYCHIATRY & NEUROLOGY

## 2019-05-26 PROCEDURE — 5130000000 HC BRIDGE APPOINTMENT

## 2019-05-26 RX ORDER — RISPERIDONE 1 MG/1
1 TABLET, ORALLY DISINTEGRATING ORAL 2 TIMES DAILY
Qty: 60 TABLET | Refills: 0 | Status: SHIPPED | OUTPATIENT
Start: 2019-05-26

## 2019-05-26 RX ADMIN — RISPERIDONE 1 MG: 1 TABLET, ORALLY DISINTEGRATING ORAL at 08:44

## 2019-05-26 ASSESSMENT — ENCOUNTER SYMPTOMS
NAUSEA: 0
SHORTNESS OF BREATH: 0
SORE THROAT: 0
BACK PAIN: 0
CHEST TIGHTNESS: 0
ABDOMINAL PAIN: 0
CONSTIPATION: 0
DIARRHEA: 0

## 2019-05-26 NOTE — BH NOTE
Pt was not forthcoming about plan upon discharge. Pt refused any follow-up services at this time. Writer included services in the discharge should he change his mind.
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
## 2019-05-26 NOTE — BH NOTE
Group Therapy Note    Date: 5/25/2019  Start Time: 1630  End Time:  1700  Number of Participants: 15    Type of Group: Wrap-Up    Patient's Goal:  Exercise and find coping skills     Notes:   Pt participated in group as evidence by verbal feedback. Pt participated in group by sharing goal and progress made toward goal.       Status After Intervention:  Improved    Participation Level:  Active Listener and Interactive    Participation Quality: Appropriate, Attentive and Sharing      Speech:  normal      Thought Process/Content: Logical      Affective Functioning: Congruent      Mood: anxious      Level of consciousness:  Alert      Response to Learning: Able to change behavior and Progressing to goal      Endings: None Reported    Modes of Intervention: Support and Socialization      Discipline Responsible: Behavorial Health Tech      Signature:  [address]

## 2019-05-26 NOTE — BH NOTE
Time: 1600      Type of Group: 1700      Level of Participation: Limited participation, patient sat on periphery       Comments: 6/19

## 2019-05-26 NOTE — BH NOTE
Bridge Appointment completed: Reviewed Discharge Instructions with patient. Patient verbalizes understanding and agreement with the discharge plan using the teachback method.      Referral for Outpatient Tobacco Cessation Counseling, upon discharge (mukund X if applicable and completed):    ( )  Hospital staff assisted patient to call Quit Line or faxed referral during hospitalization                  (X)  Recognizing danger situations (included triggers and roadblocks), if not completed on admission            (X)  Coping skills (new ways to manage stress, exercise, relaxation techniques, changing routine, distraction), if not completed on admission                                                           (x)  Basic information about quitting (benefits of quitting, techniques in how to quit, available resources, if not completed on admission  ( ) Referral for counseling faxed to [facility]   ( ) Patient refused referral  ( ) Patient refused counseling  ( ) Patient refused smoking cessation medication upon discharge    Vaccinations (mukund X if applicable and completed):  ( ) Patient states already received influenza vaccine elsewhere  ( ) Patient received influenza vaccine during this hospitalization  ( ) Patient refused influenza vaccine at this time  (X) outside of flu season (May)    Charles Garrett RN

## 2019-05-26 NOTE — PROGRESS NOTES
1:1 Assessment 10 minutes. Patient is alert and oriented x 4. Uses direct, intense eye contact and is dressed appropriately in street clothing. Patient denied SI/HI,A/V hallucinations. The patient stated his mood was:\"I'm amgry\". Patient did attend groups this shift and participated appropriately. The patient does understand why they are here. Patient is hoping to be discharged from the hospital TOMORROW. Patient is medication compliant. Judgement,insight and impulse control are limited. Patient denied any physical complaints this evening. Vital signs are noted. Safety checks continue Q 15 minutes throughout the shift. PRNS this shift? No.  Patient obtained 5.5 hours of sleep this shift.

## 2019-05-26 NOTE — PROGRESS NOTES
Department of Psychiatry  Attending Progress Note  Chief Complaint: follow-up Pt stated that he is feeling better and stated that he had second shot of invega sustenna loading dose. Pt stated that he does not feel like drug dealers are chasing him. Pt has been more social with peers. Pt stated that he needs to go to court on the first. We discussed discharge tomorrow. Patient's chart was reviewed and collaborated with  about the treatment plan. SUBJECTIVE:    Patient is feeling better. Suicidal ideation:  denies suicidal ideation. Patient does not have medication side effects. ROS: Patient has new complaints: no  Review of Systems   Constitutional: Negative for activity change and appetite change. HENT: Negative for congestion and sore throat. Eyes: Negative for visual disturbance. Respiratory: Negative for chest tightness and shortness of breath. Cardiovascular: Negative for chest pain. Gastrointestinal: Negative for abdominal pain, constipation, diarrhea and nausea. Genitourinary: Negative for difficulty urinating and dysuria. Musculoskeletal: Negative for back pain and gait problem. Skin: Negative for rash. Neurological: Negative for dizziness, tremors and headaches. Psychiatric/Behavioral: Positive for behavioral problems. Negative for agitation, decreased concentration, hallucinations and sleep disturbance. Sleeping adequately:  Yes   Appetite adequate: Yes  Attending groups: No:   Visitors:No    OBJECTIVE    Physical  VITALS:  /60   Pulse 53   Temp 98 °F (36.7 °C) (Oral)   Resp 16   Ht 5' 9\" (1.753 m)   Wt 132 lb 6.4 oz (60.1 kg)   SpO2 95%   BMI 19.55 kg/m²     Mental Status Examination:  Patients appearance was well-appearing, street clothes, in chair, good grooming and good hygiene. Thoughts are Logical. Homicidal ideations none. No abnormal movements, tics or mannerisms. Memory intact Aims 0. Concentration Good. Alert and oriented X 4. Insight and Judgement limited. Patient was cooperative. Patient gait normal. Mood anxious, affect normal affect Hallucinations Absent, suicidal ideations no specific plan to harm self Speech fluent and spontaneous    Data  Labs:   Admission on 05/21/2019   Component Date Value Ref Range Status    WBC 05/21/2019 10.4  4.0 - 11.0 K/uL Final    RBC 05/21/2019 4.27  4.20 - 5.90 M/uL Final    Hemoglobin 05/21/2019 14.2  13.5 - 17.5 g/dL Final    Hematocrit 05/21/2019 40.2* 40.5 - 52.5 % Final    MCV 05/21/2019 94.0  80.0 - 100.0 fL Final    MCH 05/21/2019 33.3  26.0 - 34.0 pg Final    MCHC 05/21/2019 35.4  31.0 - 36.0 g/dL Final    RDW 05/21/2019 13.7  12.4 - 15.4 % Final    Platelets 36/58/3052 320  135 - 450 K/uL Final    MPV 05/21/2019 7.8  5.0 - 10.5 fL Final    Neutrophils % 05/21/2019 69.0  % Final    Lymphocytes % 05/21/2019 19.7  % Final    Monocytes % 05/21/2019 9.2  % Final    Eosinophils % 05/21/2019 1.6  % Final    Basophils % 05/21/2019 0.5  % Final    Neutrophils # 05/21/2019 7.2  1.7 - 7.7 K/uL Final    Lymphocytes # 05/21/2019 2.1  1.0 - 5.1 K/uL Final    Monocytes # 05/21/2019 1.0  0.0 - 1.3 K/uL Final    Eosinophils # 05/21/2019 0.2  0.0 - 0.6 K/uL Final    Basophils # 05/21/2019 0.1  0.0 - 0.2 K/uL Final    Sodium 05/21/2019 139  136 - 145 mmol/L Final    Potassium 05/21/2019 3.7  3.5 - 5.1 mmol/L Final    Chloride 05/21/2019 105  99 - 110 mmol/L Final    CO2 05/21/2019 24  21 - 32 mmol/L Final    Anion Gap 05/21/2019 10  3 - 16 Final    Glucose 05/21/2019 116* 70 - 99 mg/dL Final    BUN 05/21/2019 6* 7 - 20 mg/dL Final    CREATININE 05/21/2019 0.6* 0.9 - 1.3 mg/dL Final    GFR Non- 05/21/2019 >60  >60 Final    Comment: >60 mL/min/1.73m2 EGFR, calc. for ages 25 and older using the  MDRD formula (not corrected for weight), is valid for stable  renal function.       GFR  05/21/2019 >60  >60 Final    Comment: Chronic Kidney Disease: less than 60 ml/min/1.73 sq.m. Kidney Failure: less than 15 ml/min/1.73 sq.m. Results valid for patients 18 years and older.  Calcium 05/21/2019 9.0  8.3 - 10.6 mg/dL Final    Total Protein 05/21/2019 7.2  6.4 - 8.2 g/dL Final    Alb 05/21/2019 4.4  3.4 - 5.0 g/dL Final    Albumin/Globulin Ratio 05/21/2019 1.6  1.1 - 2.2 Final    Total Bilirubin 05/21/2019 0.6  0.0 - 1.0 mg/dL Final    Alkaline Phosphatase 05/21/2019 61  40 - 129 U/L Final    ALT 05/21/2019 10  10 - 40 U/L Final    AST 05/21/2019 18  15 - 37 U/L Final    Globulin 05/21/2019 2.8  g/dL Final    Ethanol Lvl 05/21/2019 None Detected  mg/dL Final    Comment:    None Detected  Conversion factor:  100 mg/dl = .100 g/dl  For Medical Purposes Only      Acetaminophen Level 05/21/2019 <5* 10 - 30 ug/mL Final    Comment: Therapeutic Range: 10.0-30.0 ug/mL  Toxic: >=485 ug/mL      Salicylate, Serum 31/73/4701 0.5* 15.0 - 30.0 mg/dL Final    Comment: Therapeutic Range: 15.0-30.0 mg/dL  Toxic: >30.0 mg/dL      Amphetamine Screen, Urine 05/21/2019 Neg  Negative <1000ng/mL Final    Barbiturate Screen, Ur 05/21/2019 Neg  Negative <200 ng/mL Final    Benzodiazepine Screen, Urine 05/21/2019 Neg  Negative <200 ng/mL Final    Cannabinoid Scrn, Ur 05/21/2019 POSITIVE* Negative <50 ng/mL Final    Cocaine Metabolite Screen, Urine 05/21/2019 Neg  Negative <300 ng/mL Final    Opiate Scrn, Ur 05/21/2019 Neg  Negative <300 ng/mL Final    Comment: \"Therapeutic levels of pain medication, especially oxycontin and synthetic  opioids, may not be detected by this Methodology. Pain management screen  panel  Drug panel-PM-Hi Res Ur, Interp (PAIN) should be considered for drug  monitoring \".       PCP Screen, Urine 05/21/2019 Neg  Negative <25 ng/mL Final    Methadone Screen, Urine 05/21/2019 Neg  Negative <300 ng/mL Final    Propoxyphene Scrn, Ur 05/21/2019 Neg  Negative <300 ng/mL Final    pH, UA 05/21/2019 6.0   Final    Comment: Urine pH less than 5.0 or greater than 8.0 may indicate sample adulteration. Another sample should be collected if clinically  indicated.  Drug Screen Comment: 05/21/2019 see below   Final    Comment: This method is a screening test to detect only these drug  classes as part of a medical workup. Confirmatory testing  by another method should be ordered if clinically indicated.       Oxycodone Urine 05/21/2019 Neg  Negative <100 ng/ml Final    Ventricular Rate 05/21/2019 50  BPM Final    Atrial Rate 05/21/2019 50  BPM Final    P-R Interval 05/21/2019 120  ms Final    QRS Duration 05/21/2019 102  ms Final    Q-T Interval 05/21/2019 404  ms Final    QTc Calculation (Bazett) 05/21/2019 368  ms Final    P Axis 05/21/2019 54  degrees Final    R Axis 05/21/2019 87  degrees Final    T Axis 05/21/2019 78  degrees Final    Diagnosis 05/21/2019 Sinus bradycardia with sinus arrhythmiaOtherwise normal ECGNo previous ECGs availableConfirmed by Ginger Fisher MD, MENDOZA ([id number]) on 5/21/2019 5:05:40 PM   Final            Medications  Current Facility-Administered Medications: [COMPLETED] paliperidone palmitate ER (INVEGA SUSTENNA) IM injection 234 mg, 234 mg, Intramuscular, Once **FOLLOWED BY** [COMPLETED] paliperidone palmitate ER (INVEGA SUSTENNA) IM injection 156 mg, 156 mg, Intramuscular, Once **FOLLOWED BY** [START ON 6/15/2019] paliperidone palmitate ER (INVEGA SUSTENNA) IM injection 156 mg, 156 mg, Intramuscular, Q30 Days  acetaminophen (TYLENOL) tablet 650 mg, 650 mg, Oral, Q4H PRN  hydrOXYzine (VISTARIL) capsule 50 mg, 50 mg, Oral, TID PRN  traZODone (DESYREL) tablet 50 mg, 50 mg, Oral, Nightly PRN  benztropine mesylate (COGENTIN) injection 2 mg, 2 mg, Intramuscular, BID PRN  magnesium hydroxide (MILK OF MAGNESIA) 400 MG/5ML suspension 30 mL, 30 mL, Oral, Daily PRN  aluminum & magnesium hydroxide-simethicone (MAALOX) 200-200-20 MG/5ML suspension 30 mL, 30 mL, Oral, Q6H PRN  nicotine (NICODERM CQ) 21 MG/24HR 1 patch, 1 patch, Transdermal, Daily  risperiDONE (RISPERDAL M-TABS) disintegrating tablet 1 mg, 1 mg, Oral, BID    ASSESSMENT AND PLAN    Active Problems:    Schizophrenia ([address]    Hallucinations    Bradycardia    Wheezing    Tobacco abuse  Resolved Problems:    * No resolved hospital problems. *       1. Patient s symptoms   are improving  2. Probable discharge is tomorrow  3. Discharge planning is incomplete  4 Suicidal ideation is unchanged  5 total time 40 minutes    I spent 35 minutes face to face and more than 50 % was spent coordinating care

## 2021-07-16 NOTE — PLAN OF CARE
Felipe Mcdonald has been isolative to his room this shift. Patient states \" I am tired of the drug dealers following me around and stealing my stuff when I get out of here. \" Patient reports he is homeless. Patient medication compliant. Irritable at times as evidenced by stating \" Can you please just let me sleep?!\" Did not attend group. Encouraged participation in the milieu.
Problem: Altered Mood, Psychotic Behavior:  Goal: Able to demonstrate trust by eating, participating in treatment and following staff's direction  Description  Able to demonstrate trust by eating, participating in treatment and following staff's direction  5/25/2019 2206 by Sohail Terrazas RN  Outcome: Ongoing  Patient is irritable but agreed to come out of his room for medication as scheduled. Goal: Able to verbalize decrease in frequency and intensity of hallucinations  Description  Able to verbalize decrease in frequency and intensity of hallucinations  5/25/2019 2206 by Sohail Terrazas RN  Outcome: Ongoing  Goal: Able to verbalize reality based thinking  Description  Able to verbalize reality based thinking  5/25/2019 2206 by Sohail Terrazas RN  Outcome: Ongoing  Patient is irritated this evening. He is stating that he WILL be discharged tomorrow. He is medication compliant and ready to go to sleep.
Problem: Altered Mood, Psychotic Behavior:  Goal: Able to demonstrate trust by eating, participating in treatment and following staff's direction  Description  Able to demonstrate trust by eating, participating in treatment and following staff's direction  Outcome: Ongoing  Goal: Able to verbalize decrease in frequency and intensity of hallucinations  Description  Able to verbalize decrease in frequency and intensity of hallucinations  Outcome: Ongoing  Goal: Able to verbalize reality based thinking  Description  Able to verbalize reality based thinking  Outcome: Ongoing     Pt anxious throughout shift pacing around the unit. Paranoid. Somewhat evasive upon interviewing. Social in evening w/ peers while playing wii. Denies SI/HI/AVH. No injuries or self harm has occurred at this time. Will continue to monitor.
Problem: Altered Mood, Psychotic Behavior:  Goal: Able to verbalize decrease in frequency and intensity of hallucinations  Description  Able to verbalize decrease in frequency and intensity of hallucinations  5/22/2019 1100 by Simon Porter RN  Outcome: Ongoing   S: \"I'm still hearing them but not that bad. \"  O: Avoidant of conversation and remains in bed asleep. Easily aroused. Affect is blunted. Eye contact poor. A: No current suicidal ideation noted. Agreeable to IM Invega. P: Continue as formulated. 1:1 interactions to administer meds, encourage group attendance and review IM meds and treatment plan.
Problem: Altered Mood, Psychotic Behavior:  Goal: Able to verbalize decrease in frequency and intensity of hallucinations  Description  Able to verbalize decrease in frequency and intensity of hallucinations  Note:   Patient continues to look preoccupied, anxious, and suspicious on the unit. While taking Risperdone this morning patient starred at water for period of time before taking a drink to swallow his medication. He is restless and when visible on the unit is isolative to self. Currently in room lying in bed. Not attending groups this morning. Evasive in assessment. Currently denies all psychiatric symptoms.
Pt has been visible on unit but mostly little socialization noted. Pleasant. Attended group. Denied SI/HI. Denied hallucinations, paranoia. He reported that he hates it when people follow him to steal from him and that it happens all the time. Reported that is homeless, that has no family nor friends. He does get a disability check. He plans to walk back to [place] when discharged because he has a ticket for pot. Pt is not interested in any shelters because \"they don't have any benefit\" but then said they have showers, clothes, food and a bed reiterated that he is tired of having his things stolen. Seems to be a difficult life but his preference.
Apixaban/Eliquis increases your risk for bleeding. Notify your doctor if you experience any of the following side effects: bleeding, coughing or vomiting blood, red or black stool, unexpected pain or swelling, itching or hives, chest pain, chest tightness, trouble breathing, changes in how much or how often you urinate, red or pink urine, numbness or tingling in your feet, or unusual muscle weakness. When Apixaban/Eliquis is taken with other medicines, they can affect how it works. Taking other medications such as aspirin, blood thinners, nonsteroidal anti-inflammatories, and medications that treat depression can increase your risk of bleeding. It is very important to tell your health care provider about all of the other medicines, including over-the-counter medications, herbs, and vitamins you are taking. DO NOT start, stop, or change the dosage of any medicine, including over-the-counter medicines, vitamins, and herbal products without your doctor’s approval. Any products containing aspirin or are nonsteroidal anti-inflammatories lessen the blood’s ability to form clots and add to the effect of Apixaban/Eliquis. Never take aspirin or medicines that contain aspirin without speaking to your doctor.
